# Patient Record
Sex: MALE | Race: WHITE | NOT HISPANIC OR LATINO | Employment: OTHER | URBAN - METROPOLITAN AREA
[De-identification: names, ages, dates, MRNs, and addresses within clinical notes are randomized per-mention and may not be internally consistent; named-entity substitution may affect disease eponyms.]

---

## 2017-04-25 ENCOUNTER — OFFICE VISIT (OUTPATIENT)
Dept: FAMILY MEDICINE | Facility: CLINIC | Age: 42
End: 2017-04-25
Payer: COMMERCIAL

## 2017-04-25 VITALS
SYSTOLIC BLOOD PRESSURE: 138 MMHG | HEIGHT: 70 IN | RESPIRATION RATE: 18 BRPM | TEMPERATURE: 98 F | BODY MASS INDEX: 39.33 KG/M2 | HEART RATE: 96 BPM | DIASTOLIC BLOOD PRESSURE: 78 MMHG | OXYGEN SATURATION: 97 % | WEIGHT: 274.69 LBS

## 2017-04-25 DIAGNOSIS — Z00.00 ANNUAL PHYSICAL EXAM: Primary | ICD-10-CM

## 2017-04-25 PROCEDURE — 99386 PREV VISIT NEW AGE 40-64: CPT | Mod: S$GLB,,, | Performed by: FAMILY MEDICINE

## 2017-04-25 PROCEDURE — 99999 PR PBB SHADOW E&M-NEW PATIENT-LVL III: CPT | Mod: PBBFAC,,, | Performed by: FAMILY MEDICINE

## 2017-04-25 RX ORDER — PHENTERMINE HYDROCHLORIDE 37.5 MG/1
37.5 CAPSULE ORAL EVERY MORNING
COMMUNITY
End: 2017-09-12

## 2017-04-25 RX ORDER — AMOXICILLIN 500 MG
2 CAPSULE ORAL DAILY
COMMUNITY
End: 2020-11-22

## 2017-04-25 NOTE — PROGRESS NOTES
HPI:  Solitario Fan is a 42 y.o. year old male that  presents to get established as patients.He is trying to lose about 20 pounds and is here to have a physical and get lab work done from his weight loss doctor. He has been as much as 400 pounds following having to be on steroids for a pericarditis. He initially was very athletic and weighed about 180 pounds.  Chief Complaint   Patient presents with    Establish Care   .     HPI    History reviewed. No pertinent past medical history.  Social History     Social History    Marital status:      Spouse name: N/A    Number of children: N/A    Years of education: N/A     Occupational History    Not on file.     Social History Main Topics    Smoking status: Former Smoker     Quit date: 1/25/2017    Smokeless tobacco: Not on file    Alcohol use No    Drug use: No    Sexual activity: Not on file     Other Topics Concern    Not on file     Social History Narrative    No narrative on file     Past Surgical History:   Procedure Laterality Date    PERICARDIECTOMY      THYMECTOMY      WRIST FRACTURE SURGERY Right      Family History   Problem Relation Age of Onset    No Known Problems Mother     No Known Problems Father     No Known Problems Sister     No Known Problems Brother     No Known Problems Daughter     No Known Problems Daughter            Review of Systems  General ROS: negative for chills, fever or weight loss  Psychological ROS: negative for hallucination, depression or suicidal ideation  Ophthalmic ROS: negative for blurry vision, photophobia or eye pain  ENT ROS: negative for epistaxis, sore throat or rhinorrhea  Respiratory ROS: no cough, shortness of breath, or wheezing  Cardiovascular ROS: no chest pain or dyspnea on exertion  Gastrointestinal ROS: no abdominal pain, change in bowel habits, or black/ bloody stools  Genito-Urinary ROS: no dysuria, trouble voiding, or hematuria  Musculoskeletal ROS: negative for gait disturbance or  "muscular weakness  Neurological ROS: no syncope or seizures; no ataxia  Dermatological ROS: negative for pruritis, rash and jaundice      Physical Exam:  /78 (BP Location: Left arm, Patient Position: Sitting, BP Method: Manual)  Pulse 96  Temp 97.9 °F (36.6 °C) (Oral)   Resp 18  Ht 5' 10" (1.778 m)  Wt 124.6 kg (274 lb 11.1 oz)  SpO2 97%  BMI 39.41 kg/m2  General appearance: alert, cooperative, no distress  Constitutional:Oriented to person, place, and time.appears well-developed and well-nourished.  HEENT: Normocephalic, atraumatic, neck symmetrical, no nasal discharge, TM - clear bilaterally   Eyes: conjunctivae/corneas clear, PERRL, EOM's intact  Lungs: clear to auscultation bilaterally, no dullness to percussion bilaterally  Heart: regular rate and rhythm without rub; no displacement of the PMI   Abdomen: soft, non-tender; bowel sounds normoactive; no organomegaly  Extremities: extremities symmetric; no clubbing, cyanosis, or edema  Integument: Skin color, texture, turgor normal; no rashes; hair distrubution normal  Neurologic: Alert and oriented X 3, normal strength, normal coordination and gait  Psychiatric: no pressured speech; normal affect; no evidence of impaired cognition   Physical Exam  LABS:    Complete Blood Count  No results found for: RBC, HGB, HCT, MCV, MCH, MCHC, RDW, PLT, MPV, GRAN, LYMPH, MONO, EOS, BASO, GRAN, LYMPH, MONO, EOSINOPHIL, BASOPHIL, DIFFMETHOD    Comprehensive Metabolic Panel  No results found for: GLU, BUN, CREATININE, NA, K, CL, PROT, ALBUMIN, BILITOT, AST, ALKPHOS, CO2, ALT, ANIONGAP, EGFRNONAA, ESTGFRAFRICA    LIPID  No components found for: LIPIDPANEL    TSH  No results found for: TSH      Assessment:    ICD-10-CM ICD-9-CM    1. Annual physical exam Z00.00 V70.0 Comprehensive metabolic panel      Lipid panel      CBC auto differential      TSH         Plan:    Return in 1 week (on 5/2/2017).          Jyotsna Seay MD  "

## 2017-04-25 NOTE — MR AVS SNAPSHOT
"    [facility]  [id number] [place] [address]  [place] [address]  Phone: [phone]  Fax: [phone]                  Solitario Fan   2017 2:00 PM   Office Visit    Description:  Male : 1975   Provider:  Jyotsna Seay MD   Department:  [facility]           Reason for Visit     Establish Care           Diagnoses this Visit        Comments    Annual physical exam    -  Primary            To Do List           Future Appointments        Provider Department Dept Phone    10/25/2017 8:00 AM Jyotsna Seay MD [facility] [phone]      Goals (5 Years of Data)     None      [facility]     [facility] On Call Nurse Care Line -  Assistance  Unless otherwise directed by your provider, please contact [facility], our nurse care line that is available for  assistance.     Registered nurses in the [facility] On Call Center provide: appointment scheduling, clinical advisement, health education, and other advisory services.  Call: [phone] (toll free)               Medications                Verify that the below list of medications is an accurate representation of the medications you are currently taking.  If none reported, the list may be blank. If incorrect, please contact your healthcare provider. Carry this list with you in case of emergency.           Current Medications     fish oil-omega-3 fatty acids 300-1,000 mg capsule Take 2 g by mouth once daily.    phentermine (ADIPEX-P) 37.5 MG capsule Take 37.5 mg by mouth every morning.           Clinical Reference Information           Your Vitals Were     BP Pulse Temp Resp    138/78 (BP Location: Left arm, Patient Position: Sitting, BP Method: Manual) 96 97.9 °F (36.6 °C) (Oral) 18    Height Weight SpO2 BMI    5' 10" (1.778 m) 124.6 kg (274 lb 11.1 oz) 97% 39.41 kg/m2      Blood Pressure          Most Recent Value    BP  138/78      Allergies as of 2017     No Known Allergies    "   Immunizations Administered on Date of Encounter - 4/25/2017     None      Orders Placed During Today's Visit     Future Labs/Procedures Expected by Expires    CBC auto differential  4/25/2017 6/24/2018    Comprehensive metabolic panel  4/25/2017 6/24/2018    Lipid panel  4/25/2017 6/24/2018    TSH  4/25/2017 6/24/2018      Language Assistance Services     ATTENTION: Language assistance services are available, free of charge. Please call [phone].      ATENCIÓN: Si habla erick, tiene a schroeder disposición servicios gratuitos de asistencia lingüística. Llame al [phone].     CHÚ Ý: N?u b?n nói Ti?ng Vi?t, có các d?ch v? h? tr? ngôn ng? mi?n phí dành cho b?n. G?i s? [phone].         [facility] complies with applicable Federal civil rights laws and does not discriminate on the basis of race, color, national origin, age, disability, or sex.

## 2017-04-27 ENCOUNTER — PATIENT MESSAGE (OUTPATIENT)
Dept: FAMILY MEDICINE | Facility: CLINIC | Age: 42
End: 2017-04-27

## 2017-05-04 ENCOUNTER — OFFICE VISIT (OUTPATIENT)
Dept: FAMILY MEDICINE | Facility: CLINIC | Age: 42
End: 2017-05-04
Payer: COMMERCIAL

## 2017-05-04 VITALS
RESPIRATION RATE: 18 BRPM | WEIGHT: 273.69 LBS | HEART RATE: 100 BPM | BODY MASS INDEX: 39.18 KG/M2 | OXYGEN SATURATION: 96 % | HEIGHT: 70 IN | TEMPERATURE: 98 F | DIASTOLIC BLOOD PRESSURE: 80 MMHG | SYSTOLIC BLOOD PRESSURE: 132 MMHG

## 2017-05-04 DIAGNOSIS — R74.8 ABNORMAL LIVER ENZYMES: Primary | ICD-10-CM

## 2017-05-04 DIAGNOSIS — E78.1 HYPERTRIGLYCERIDEMIA: ICD-10-CM

## 2017-05-04 PROCEDURE — 99213 OFFICE O/P EST LOW 20 MIN: CPT | Mod: S$GLB,,, | Performed by: FAMILY MEDICINE

## 2017-05-04 PROCEDURE — 1160F RVW MEDS BY RX/DR IN RCRD: CPT | Mod: S$GLB,,, | Performed by: FAMILY MEDICINE

## 2017-05-04 PROCEDURE — 99999 PR PBB SHADOW E&M-EST. PATIENT-LVL III: CPT | Mod: PBBFAC,,, | Performed by: FAMILY MEDICINE

## 2017-05-04 NOTE — MR AVS SNAPSHOT
"    [facility]  [address] [place] [address]  [place] [address]  Phone: [phone]  Fax: [phone]                  Solitario Fan   2017 8:40 AM   Office Visit    Description:  Male : 1975   Provider:  Jyotsna Seay MD   Department:  [facility]           Reason for Visit     Follow-up           Diagnoses this Visit        Comments    Abnormal liver enzymes    -  Primary     Hypertriglyceridemia                To Do List           Future Appointments        Provider Department Dept Phone    10/25/2017 8:00 AM Jyotsna Seay MD [facility] [phone]      Goals (5 Years of Data)     None      [facility] On [facility]     [facility] On Call Nurse Care Line -  Assistance  Unless otherwise directed by your provider, please contact [facility], our nurse care line that is available for  assistance.     Registered nurses in the [facility] On Call Center provide: appointment scheduling, clinical advisement, health education, and other advisory services.  Call: [phone] (toll free)               Medications                Verify that the below list of medications is an accurate representation of the medications you are currently taking.  If none reported, the list may be blank. If incorrect, please contact your healthcare provider. Carry this list with you in case of emergency.           Current Medications     fish oil-omega-3 fatty acids 300-1,000 mg capsule Take 2 g by mouth once daily.    phentermine (ADIPEX-P) 37.5 MG capsule Take 37.5 mg by mouth every morning.           Clinical Reference Information           Your Vitals Were     BP Pulse Temp Resp Height Weight    132/80 (BP Location: Left arm, Patient Position: Sitting, BP Method: Manual) 100 98 °F (36.7 °C) (Oral) 18 5' 10" (1.778 m) 124.2 kg (273 lb 11.2 oz)    SpO2 BMI             96% 39.27 kg/m2         Blood Pressure          Most Recent Value    BP  132/80      Allergies as of 2017     " No Known Allergies      Immunizations Administered on Date of Encounter - 5/4/2017     None      Orders Placed During Today's Visit     Future Labs/Procedures Expected by Expires    Hepatic function panel  5/4/2017 7/3/2018    Hepatitis panel, acute  5/4/2017 7/3/2018    Lipid panel  5/4/2017 7/3/2018      Language Assistance Services     ATTENTION: Language assistance services are available, free of charge. Please call [phone].      ATENCIÓN: Si habla erick, tiene a schroeder disposición servicios gratuitos de asistencia lingüística. Llame al [phone].     CHÚ Ý: N?u b?n nói Ti?ng Vi?t, có các d?ch v? h? tr? ngôn ng? mi?n phí dành cho b?n. G?i s? [phone].         [facility] complies with applicable Federal civil rights laws and does not discriminate on the basis of race, color, national origin, age, disability, or sex.

## 2017-05-08 NOTE — PROGRESS NOTES
"HPI:  Solitario Fan is a 42 y.o. year old male that  presents for lab resuts.  Chief Complaint   Patient presents with    Follow-up     lab results   .     HPI      History reviewed. No pertinent past medical history.  Social History     Social History    Marital status:      Spouse name: N/A    Number of children: N/A    Years of education: N/A     Occupational History    Not on file.     Social History Main Topics    Smoking status: Former Smoker     Quit date: 1/25/2017    Smokeless tobacco: Not on file    Alcohol use No    Drug use: No    Sexual activity: Not on file     Other Topics Concern    Not on file     Social History Narrative     Past Surgical History:   Procedure Laterality Date    PERICARDIECTOMY      THYMECTOMY      WRIST FRACTURE SURGERY Right      Family History   Problem Relation Age of Onset    No Known Problems Mother     No Known Problems Father     No Known Problems Sister     No Known Problems Brother     No Known Problems Daughter     No Known Problems Daughter            Review of Systems  General ROS: negative for chills, fever or weight loss  ENT ROS: negative for epistaxis, sore throat or rhinorrhea  Respiratory ROS: no cough, shortness of breath, or wheezing  Cardiovascular ROS: no chest pain or dyspnea on exertion  Gastrointestinal ROS: no abdominal pain, change in bowel habits, or black/ bloody stools    Physical Exam:  /80 (BP Location: Left arm, Patient Position: Sitting, BP Method: Manual)  Pulse 100  Temp 98 °F (36.7 °C) (Oral)   Resp 18  Ht 5' 10" (1.778 m)  Wt 124.2 kg (273 lb 11.2 oz)  SpO2 96%  BMI 39.27 kg/m2  General appearance: alert, cooperative, no distress  Constitutional:Oriented to person, place, and time.appears well-developed and well-nourished.  Lungs: clear to auscultation bilaterally, no dullness to percussion bilaterally  Heart: regular rate and rhythm without rub; no displacement of the PMI , S1&S2 present    Physical " Exam    LABS:    Complete Blood Count  Lab Results   Component Value Date    RBC 5.61 04/26/2017    HGB 16.4 04/26/2017    HCT 46.2 04/26/2017    MCV 82 04/26/2017    MCH 29.2 04/26/2017    MCHC 35.5 04/26/2017    RDW 12.9 04/26/2017     04/26/2017    MPV 11.2 04/26/2017    GRAN 2.8 04/26/2017    GRAN 46.8 04/26/2017    LYMPH 2.5 04/26/2017    LYMPH 41.6 04/26/2017    MONO 0.5 04/26/2017    MONO 9.1 04/26/2017    EOS 0.1 04/26/2017    BASO 0.02 04/26/2017    EOSINOPHIL 2.2 04/26/2017    BASOPHIL 0.3 04/26/2017    DIFFMETHOD Automated 04/26/2017       Comprehensive Metabolic Panel  Lab Results   Component Value Date    GLU 98 04/26/2017    BUN 15 04/26/2017    CREATININE 1.17 04/26/2017     (H) 04/26/2017    K 4.0 04/26/2017     04/26/2017    PROT 8.1 04/26/2017    ALBUMIN 4.9 04/26/2017    BILITOT 1.1 (H) 04/26/2017    AST 39 04/26/2017    ALKPHOS 46 04/26/2017    CO2 28 04/26/2017    ALT 73 (H) 04/26/2017    ANIONGAP 16 04/26/2017    EGFRNONAA >60.0 04/26/2017    ESTGFRAFRICA >60.0 04/26/2017       LIPID  No components found for: LIPID PROFILE    TSH  Lab Results   Component Value Date    TSH 2.740 04/26/2017         Assessment:    ICD-10-CM ICD-9-CM    1. Abnormal liver enzymes R74.8 790.5 Hepatitis panel, acute      Hepatic function panel   2. Hypertriglyceridemia E78.1 272.1 Lipid panel         Plan:  Pt continues to be actively working on changing his diet and lifestyle to improve his weight , health and triglycerides. Will recheck lipid panel prior to next visit.Solitario was given information on how to improve their cholesterol by 1) Decreasing their intake of high fat foods (i.e. Fried foods,long, potato chips), 2) Increase GOOD fat intake (i.e. Omega Fatty Acids- olive oil , baked and broiled fish, flax seed,coconut oil), 3) Exercise 5 times a week,  30min/day, 4) High fiber intake daily (i.e. Whole grains and vegetable and raw fruits.   Return in 6 months (on 10/25/2017).          Jyotsna J  JOHNY Seay MD

## 2017-09-07 ENCOUNTER — OFFICE VISIT (OUTPATIENT)
Dept: FAMILY MEDICINE | Facility: CLINIC | Age: 42
End: 2017-09-07
Payer: COMMERCIAL

## 2017-09-07 VITALS
WEIGHT: 271.25 LBS | HEIGHT: 70 IN | TEMPERATURE: 98 F | SYSTOLIC BLOOD PRESSURE: 126 MMHG | OXYGEN SATURATION: 98 % | HEART RATE: 86 BPM | BODY MASS INDEX: 38.83 KG/M2 | RESPIRATION RATE: 18 BRPM | DIASTOLIC BLOOD PRESSURE: 74 MMHG

## 2017-09-07 DIAGNOSIS — R29.898 WEAKNESS OF FOOT, LEFT: ICD-10-CM

## 2017-09-07 DIAGNOSIS — R03.0 BLOOD PRESSURE ELEVATED WITHOUT HISTORY OF HTN: ICD-10-CM

## 2017-09-07 DIAGNOSIS — R20.9 DISTURBANCE OF SKIN SENSATION: Primary | ICD-10-CM

## 2017-09-07 PROCEDURE — 3008F BODY MASS INDEX DOCD: CPT | Mod: S$GLB,,, | Performed by: FAMILY MEDICINE

## 2017-09-07 PROCEDURE — 99999 PR PBB SHADOW E&M-EST. PATIENT-LVL IV: CPT | Mod: PBBFAC,,, | Performed by: FAMILY MEDICINE

## 2017-09-07 PROCEDURE — 99214 OFFICE O/P EST MOD 30 MIN: CPT | Mod: S$GLB,,, | Performed by: FAMILY MEDICINE

## 2017-09-07 NOTE — PROGRESS NOTES
HPI:  Solitario Fan is a 42 y.o. year old male that  presents with 3 week history of left sided rib pain. He also has a had a cough. He had sensation on Tuesday of finger tingling and numbness ion the left side of his faceand upper and lower extremities.. He went and checked his BP and it was 220/210 and then 198/168. He was treated for dehydration and bronchitis.all of his cardiac enzymes where nl.He denies any stress due to   Chief Complaint   Patient presents with    Follow-up     ED visit for hypertension&chest pain 9/05   .     HPI      Past Medical History:   Diagnosis Date    Hyperlipidemia     Thyroid disease      Social History     Social History    Marital status:      Spouse name: N/A    Number of children: N/A    Years of education: N/A     Occupational History    Not on file.     Social History Main Topics    Smoking status: Former Smoker     Quit date: 1/25/2017    Smokeless tobacco: Never Used    Alcohol use Yes      Comment: social    Drug use: No    Sexual activity: Not on file     Other Topics Concern    Not on file     Social History Narrative    No narrative on file     Past Surgical History:   Procedure Laterality Date    PERICARDIECTOMY      THYMECTOMY      WRIST FRACTURE SURGERY Right      Family History   Problem Relation Age of Onset    No Known Problems Mother     No Known Problems Father     No Known Problems Sister     No Known Problems Brother     No Known Problems Daughter     No Known Problems Daughter            Review of Systems  General ROS: negative for chills, fever or weight loss  ENT ROS: negative for epistaxis, sore throat or rhinorrhea  Respiratory ROS: no cough, shortness of breath, or wheezing  Cardiovascular ROS: no chest pain or dyspnea on exertion  Gastrointestinal ROS: no abdominal pain, change in bowel habits, or black/ bloody stools    Physical Exam:  /74 (BP Location: Right arm, Patient Position: Sitting, BP Method: Medium (Manual))   " Pulse 86   Temp 98.4 °F (36.9 °C) (Oral)   Resp 18   Ht 5' 10" (1.778 m)   Wt 123.1 kg (271 lb 4.4 oz)   SpO2 98%   BMI 38.92 kg/m²   General appearance: alert, cooperative, no distress  Constitutional:Oriented to person, place, and time.appears well-developed and well-nourished.  HEENT: Normocephalic, atraumatic, neck symmetrical, no nasal discharge, TM- clear bilaterally  Lungs: clear to auscultation bilaterally, no dullness to percussion bilaterally  Heart: regular rate and rhythm without rub; no displacement of the PMI , S1&S2 present  Abdomen: soft, non-tender; bowel sounds normoactive; no organomegaly  Physical Exam    LABS:    Complete Blood Count  Lab Results   Component Value Date    RBC 5.77 09/05/2017    HGB 17.1 09/05/2017    HCT 48.4 09/05/2017    MCV 84 09/05/2017    MCH 29.6 09/05/2017    MCHC 35.3 09/05/2017    RDW 13.7 09/05/2017     09/05/2017    MPV 10.8 09/05/2017    GRAN 4.8 09/05/2017    GRAN 48.3 09/05/2017    LYMPH 4.2 09/05/2017    LYMPH 42.5 09/05/2017    MONO 0.7 09/05/2017    MONO 7.5 09/05/2017    EOS 0.1 09/05/2017    BASO 0.03 09/05/2017    EOSINOPHIL 1.4 09/05/2017    BASOPHIL 0.3 09/05/2017    DIFFMETHOD Automated 09/05/2017       Comprehensive Metabolic Panel  Lab Results   Component Value Date     (H) 09/05/2017    BUN 20 09/05/2017    CREATININE 1.05 09/05/2017     09/05/2017    K 3.4 (L) 09/05/2017    CL 99 09/05/2017    PROT 8.9 (H) 09/05/2017    ALBUMIN 5.0 09/05/2017    BILITOT 0.9 09/05/2017    AST 32 09/05/2017    ALKPHOS 49 09/05/2017    CO2 27 09/05/2017    ALT 57 (H) 09/05/2017    ANIONGAP 13 09/05/2017    EGFRNONAA >60.0 09/05/2017    ESTGFRAFRICA >60.0 09/05/2017       LIPID  Lab Results   Component Value Date    CHOL 180 09/12/2017    HDL 34 (L) 09/12/2017         TSH  Lab Results   Component Value Date    TSH 2.740 04/26/2017       Current Outpatient Prescriptions   Medication Sig Dispense Refill    fish oil-omega-3 fatty acids 300-1,000 " mg capsule Take 2 g by mouth once daily.      naproxen (NAPROSYN) 500 MG tablet Take 1 tablet (500 mg total) by mouth 2 (two) times daily as needed (pain). 60 tablet 0    diphenhydrAMINE (BENADRYL) 25 mg capsule Take 1 each (25 mg total) by mouth every 6 (six) hours. 30 capsule 0    gemfibrozil (LOPID) 600 MG tablet Take 1 tablet (600 mg total) by mouth 2 (two) times daily before meals. 180 tablet 3     No current facility-administered medications for this visit.        Assessment:    ICD-10-CM ICD-9-CM    1. Disturbance of skin sensation R20.9 782.0 US Carotid Bilateral   2. Weakness of foot, left M21.42 734 US Carotid Bilateral   3. Blood pressure elevated without history of HTN R03.0 796.2          Plan:    Return in 7 weeks (on 10/25/2017).          Jyotsna Seay MD

## 2017-09-08 ENCOUNTER — HOSPITAL ENCOUNTER (OUTPATIENT)
Dept: RADIOLOGY | Facility: HOSPITAL | Age: 42
Discharge: HOME | End: 2017-09-08
Attending: FAMILY MEDICINE
Payer: COMMERCIAL

## 2017-09-08 ENCOUNTER — PATIENT MESSAGE (OUTPATIENT)
Dept: FAMILY MEDICINE | Facility: CLINIC | Age: 42
End: 2017-09-08

## 2017-09-08 DIAGNOSIS — R29.898 WEAKNESS OF FOOT, LEFT: ICD-10-CM

## 2017-09-08 PROCEDURE — 93880 EXTRACRANIAL BILAT STUDY: CPT | Mod: TC

## 2017-09-08 PROCEDURE — 93880 EXTRACRANIAL BILAT STUDY: CPT | Mod: 26,,, | Performed by: RADIOLOGY

## 2017-09-11 DIAGNOSIS — I65.23 ATHEROSCLEROSIS OF BOTH CAROTID ARTERIES: Primary | ICD-10-CM

## 2017-09-12 ENCOUNTER — TELEPHONE (OUTPATIENT)
Dept: FAMILY MEDICINE | Facility: CLINIC | Age: 42
End: 2017-09-12

## 2017-09-12 ENCOUNTER — OFFICE VISIT (OUTPATIENT)
Dept: FAMILY MEDICINE | Facility: CLINIC | Age: 42
End: 2017-09-12
Payer: COMMERCIAL

## 2017-09-12 VITALS
HEIGHT: 70 IN | DIASTOLIC BLOOD PRESSURE: 64 MMHG | HEART RATE: 75 BPM | RESPIRATION RATE: 18 BRPM | TEMPERATURE: 98 F | OXYGEN SATURATION: 97 % | BODY MASS INDEX: 39.16 KG/M2 | SYSTOLIC BLOOD PRESSURE: 110 MMHG | WEIGHT: 273.56 LBS

## 2017-09-12 VITALS
SYSTOLIC BLOOD PRESSURE: 118 MMHG | RESPIRATION RATE: 20 BRPM | DIASTOLIC BLOOD PRESSURE: 76 MMHG | BODY MASS INDEX: 39.16 KG/M2 | HEIGHT: 70 IN | WEIGHT: 273.56 LBS | OXYGEN SATURATION: 98 % | HEART RATE: 98 BPM

## 2017-09-12 DIAGNOSIS — E78.1 HYPERTRIGLYCERIDEMIA: ICD-10-CM

## 2017-09-12 DIAGNOSIS — E78.1 HYPERTRIGLYCERIDEMIA: Primary | ICD-10-CM

## 2017-09-12 DIAGNOSIS — T78.40XA ALLERGIC REACTION CAUSED BY A DRUG, INITIAL ENCOUNTER: Primary | ICD-10-CM

## 2017-09-12 DIAGNOSIS — I65.23 ATHEROSCLEROSIS OF BOTH CAROTID ARTERIES: Primary | ICD-10-CM

## 2017-09-12 PROCEDURE — 99213 OFFICE O/P EST LOW 20 MIN: CPT | Mod: 25,S$GLB,, | Performed by: NURSE PRACTITIONER

## 2017-09-12 PROCEDURE — 96372 THER/PROPH/DIAG INJ SC/IM: CPT | Mod: S$GLB,,, | Performed by: NURSE PRACTITIONER

## 2017-09-12 PROCEDURE — 99999 PR PBB SHADOW E&M-EST. PATIENT-LVL III: CPT | Mod: PBBFAC,,, | Performed by: FAMILY MEDICINE

## 2017-09-12 PROCEDURE — 99999 PR PBB SHADOW E&M-EST. PATIENT-LVL V: CPT | Mod: PBBFAC,,, | Performed by: NURSE PRACTITIONER

## 2017-09-12 PROCEDURE — 3008F BODY MASS INDEX DOCD: CPT | Mod: S$GLB,,, | Performed by: NURSE PRACTITIONER

## 2017-09-12 PROCEDURE — 99214 OFFICE O/P EST MOD 30 MIN: CPT | Mod: 25,S$GLB,, | Performed by: FAMILY MEDICINE

## 2017-09-12 PROCEDURE — 3008F BODY MASS INDEX DOCD: CPT | Mod: S$GLB,,, | Performed by: FAMILY MEDICINE

## 2017-09-12 RX ORDER — GEMFIBROZIL 600 MG/1
600 TABLET, FILM COATED ORAL
Qty: 180 TABLET | Refills: 3 | Status: SHIPPED | OUTPATIENT
Start: 2017-09-12 | End: 2018-09-12

## 2017-09-12 RX ORDER — DIPHENHYDRAMINE HCL 25 MG
25 CAPSULE ORAL EVERY 6 HOURS
Qty: 30 CAPSULE | Refills: 0 | Status: SHIPPED | OUTPATIENT
Start: 2017-09-12 | End: 2017-12-12

## 2017-09-12 RX ORDER — DIPHENHYDRAMINE HYDROCHLORIDE 50 MG/ML
50 INJECTION INTRAMUSCULAR; INTRAVENOUS
Status: COMPLETED | OUTPATIENT
Start: 2017-09-12 | End: 2017-09-12

## 2017-09-12 RX ORDER — GEMFIBROZIL 600 MG/1
600 TABLET, FILM COATED ORAL
Qty: 180 TABLET | Refills: 3 | Status: CANCELLED | OUTPATIENT
Start: 2017-09-12 | End: 2018-09-12

## 2017-09-12 RX ORDER — PHENTERMINE HYDROCHLORIDE 37.5 MG/1
37.5 TABLET ORAL DAILY
Refills: 0 | COMMUNITY
Start: 2017-06-14 | End: 2017-09-12

## 2017-09-12 RX ORDER — CLINDAMYCIN HYDROCHLORIDE 300 MG/1
CAPSULE ORAL
Refills: 0 | COMMUNITY
Start: 2017-06-13 | End: 2017-09-12

## 2017-09-12 RX ADMIN — DIPHENHYDRAMINE HYDROCHLORIDE 50 MG: 50 INJECTION INTRAMUSCULAR; INTRAVENOUS at 12:09

## 2017-09-12 NOTE — PATIENT INSTRUCTIONS
Local Allergic Reaction, Other  You are having an allergic reaction. Almost anything can cause one. Different people are allergic to different things. It is usually something that you ate or swallowed, came into contact with by getting or putting it on your skin or clothes, or something you breathed in the air. This can be very annoying and sometimes scary.  Symptoms of an allergic reaction can include:  · Rash, hives, redness, welts, blisters  · Itching, burning, stinging, pain  · Dry, flaky, cracking, scaly skin  · Swelling of the face, lips or other parts of the body  Sometimes the cause of an allergic reaction may be obvious. To help identify your allergen, remember:  · When it started  · What you were doing at the time or just before that  · Any activities you were involved in  · Any new products or contacts  Here are some common causes, but remember almost anything can cause a reaction, and you may not even be aware that you came into contact with one of these things.  · Dust, mold, pollen  · Plants such as poison ivy and poison oak are common ones, but there are many others  · Animals  · Foods such as shrimp, shellfish, peanuts, milk products, gluten, eggs; also colorings, flavorings, additives  · Insect bites or stings such as bees, mosquitos, flees, ticks  · Medicines such as penicillin, sulfa drugs, amoxicillin, aspirin, ibuprofen; any medicine can cause a reaction  · Jewelry such as nickel, gold  (new, or something youve worn for a while including zippers, and  buttons)  · Latex such as in gloves, clothes, toys, balloons, or some tapes (some people allergic to latex may also have problems with foods like bananas, avocados, kiwi, papaya, or chestnuts)  · Lotions, perfumes, cosmetics, soaps, shampoos, skincare products, nail products  · Chemicals or dyes in clothing, linen, , hair dyes, soaps, iodine  Home care    The goal of our treatment is to help relieve the symptoms, and get you feeling  better. The rash will usually fade over several days, but can sometimes last a couple of weeks. Over the next couple of days, there may be times when it is gets a little worse, and then better again. Here are some things to do:  · If you know what you are allergic to, avoid it because future reactions could be worse than this one.  · Avoid tight clothing and anything that heats up your skin (hot showers/baths, direct sunlight) since heat will make itching worse.  · An ice pack will relieve local areas of intense itching and redness. Dont put the ice directly on the skin, because it can damage the skin. You can also ice put it in a plastic bag. Wrap it in something like a towel, terrie shirt, or cloth.  · Oral Benadryl (diphenhydramine) is an antihistamine available at drug and grocery stores. Unless a prescription antihistamine was given, Benadryl may be used to reduce itching if large areas of the skin are involved. It may make you sleepy, so be careful using it in the daytime or when going to school, working, or driving. [NOTE: Do not use Benadryl if you have glaucoma or if you are a man with trouble urinating due to an enlarged prostate.] There are antihistamines that causes less drowsiness and is a good alternatives for daytime use. Ask your pharmacist for suggestions.  · Do not use Benadryl cream on your skin, because in some people it can cause a further reaction, and make you allergic to Benadryl.  · Try not to scratch. This can tear the skin and cause an infection.  · Using heat-steam to clean your home, using high-efficiency particulate (HEPA) vacuums and filters, avoiding food and pet triggers, exterminating cockroaches, and frequent house cleaning are a few of the strategies used to decrease allergic reactions.  Follow-up care  Follow up with your healthcare provider, or as advised if your symptoms do not continue to improve or get worse.  Call 911  Call 911 if any of these occur:  · Trouble breathing or  swallowing, wheezing  · New or worsening swelling in the mouth, throat, or tongue  · Hoarse voice or trouble speaking  · Confused   · Very drowsy or trouble awakening  · Fainting or loss of consciousness  · Rapid heart rate  · Low blood pressure  · Feeling of doom  · Nausea, vomiting, abdominal pain, diarrhea  · Vomiting blood, or large amounts of blood in stool  · Seizure  When to seek medical advice  Call your healthcare provider right away if any of the following occur:  · Spreading areas of itching, redness or swelling  · New or worse swelling in the face, eyelids, or  lips  · Dizziness, weakness  · Signs of infection:  ¨ Spreading redness  ¨ Increased pain or swelling  ¨ Fever of 100.4ºF (38ºC) or higher, or as directed by your healthcare provider  ¨ Colored fluid draining from the inflamed areas  Date Last Reviewed: 7/30/2015  © 4257-3569 [employer]. [address] [place] [address]. All rights reserved. This information is not intended as a substitute for professional medical care. Always follow your healthcare professional's instructions.

## 2017-09-12 NOTE — TELEPHONE ENCOUNTER
----- Message from Art Barnes sent at 9/12/2017 12:37 PM CDT -----  Contact: wife/Stephani  [phone]  Pt wife requesting to speak with the nurse concerning the pt having an allergic reaction to medication.  Please call and advise

## 2017-09-12 NOTE — PROGRESS NOTES
"HPI:  Solitario Fan is a 42 y.o. year old male that  presents fro f/u of carotid doppler results . He got his lipid level done this morning. He is currently only taking the omega 3 suppliment. No new episode of upper body tingling noted.  Chief Complaint   Patient presents with    Follow-up     US results--lipid not processed yet   .     HPI      Past Medical History:   Diagnosis Date    Hyperlipidemia     Thyroid disease      Social History     Social History    Marital status:      Spouse name: N/A    Number of children: N/A    Years of education: N/A     Occupational History    Not on file.     Social History Main Topics    Smoking status: Former Smoker     Quit date: 1/25/2017    Smokeless tobacco: Never Used    Alcohol use Yes      Comment: social    Drug use: No    Sexual activity: Not on file     Other Topics Concern    Not on file     Social History Narrative    No narrative on file     Past Surgical History:   Procedure Laterality Date    PERICARDIECTOMY      THYMECTOMY      WRIST FRACTURE SURGERY Right      Family History   Problem Relation Age of Onset    No Known Problems Mother     No Known Problems Father     No Known Problems Sister     No Known Problems Brother     No Known Problems Daughter     No Known Problems Daughter            Review of Systems  General ROS: negative for chills, fever or weight loss  ENT ROS: negative for epistaxis, sore throat or rhinorrhea  Respiratory ROS: no cough, shortness of breath, or wheezing  Cardiovascular ROS: no chest pain or dyspnea on exertion  Gastrointestinal ROS: no abdominal pain, change in bowel habits, or black/ bloody stools    Physical Exam:  /64 (BP Location: Left arm, Patient Position: Sitting, BP Method: Medium (Manual))   Pulse 75   Temp 98.3 °F (36.8 °C) (Oral)   Resp 18   Ht 5' 10" (1.778 m)   Wt 124.1 kg (273 lb 9.5 oz)   SpO2 97%   BMI 39.26 kg/m²   General appearance: alert, cooperative, no " distress  Constitutional:Oriented to person, place, and time.appears well-developed and well-nourished.  Lungs: clear to auscultation bilaterally, no dullness to percussion bilaterally  Heart: regular rate and rhythm without rub; no displacement of the PMI , S1&S2 present    Physical Exam    LABS:    Complete Blood Count  Lab Results   Component Value Date    RBC 5.77 09/05/2017    HGB 17.1 09/05/2017    HCT 48.4 09/05/2017    MCV 84 09/05/2017    MCH 29.6 09/05/2017    MCHC 35.3 09/05/2017    RDW 13.7 09/05/2017     09/05/2017    MPV 10.8 09/05/2017    GRAN 4.8 09/05/2017    GRAN 48.3 09/05/2017    LYMPH 4.2 09/05/2017    LYMPH 42.5 09/05/2017    MONO 0.7 09/05/2017    MONO 7.5 09/05/2017    EOS 0.1 09/05/2017    BASO 0.03 09/05/2017    EOSINOPHIL 1.4 09/05/2017    BASOPHIL 0.3 09/05/2017    DIFFMETHOD Automated 09/05/2017       Comprehensive Metabolic Panel  Lab Results   Component Value Date     (H) 09/05/2017    BUN 20 09/05/2017    CREATININE 1.05 09/05/2017     09/05/2017    K 3.4 (L) 09/05/2017    CL 99 09/05/2017    PROT 8.9 (H) 09/05/2017    ALBUMIN 5.0 09/05/2017    BILITOT 0.9 09/05/2017    AST 32 09/05/2017    ALKPHOS 49 09/05/2017    CO2 27 09/05/2017    ALT 57 (H) 09/05/2017    ANIONGAP 13 09/05/2017    EGFRNONAA >60.0 09/05/2017    ESTGFRAFRICA >60.0 09/05/2017       LIPID  Lab Results   Component Value Date    CHOL 180 09/12/2017    HDL 34 (L) 09/12/2017         TSH  Lab Results   Component Value Date    TSH 2.740 04/26/2017       Current Outpatient Prescriptions   Medication Sig Dispense Refill    fish oil-omega-3 fatty acids 300-1,000 mg capsule Take 2 g by mouth once daily.      naproxen (NAPROSYN) 500 MG tablet Take 1 tablet (500 mg total) by mouth 2 (two) times daily as needed (pain). 60 tablet 0    benzonatate (TESSALON) 100 MG capsule Take 1 capsule (100 mg total) by mouth 3 (three) times daily as needed for Cough. 20 capsule 0    diphenhydrAMINE (BENADRYL) 25 mg  capsule Take 1 each (25 mg total) by mouth every 6 (six) hours. 30 capsule 0    gemfibrozil (LOPID) 600 MG tablet Take 1 tablet (600 mg total) by mouth 2 (two) times daily before meals. 180 tablet 3     No current facility-administered medications for this visit.        Assessment:    ICD-10-CM ICD-9-CM    1. Atherosclerosis of both carotid arteries I65.23 433.10      433.30    2. Hypertriglyceridemia E78.1 272.1          Plan:  Instructed pt to begin a cholesterol plaque regression regimen of  Niacin, vitamin E , and garlic . He was cautioned that the niacin may cause facial flushing and to back down one tablet to dosage that flushing is not a problem.   I will decide which medication to start for his cholesterol once his results come in today for his lipid panel.  Return in 3 months (on 12/12/2017).          Jyotsna Seay MD  Answers for HPI/ROS submitted by the patient on 9/11/2017   activity change: No  unexpected weight change: No  neck pain: No  hearing loss: No  rhinorrhea: No  trouble swallowing: No  eye discharge: No  visual disturbance: No  chest tightness: No  wheezing: No  chest pain: No  palpitations: No  blood in stool: No  constipation: No  vomiting: No  diarrhea: No  polydipsia: No  polyuria: No  difficulty urinating: No  urgency: No  hematuria: No  joint swelling: No  arthralgias: No  headaches: No  weakness: No  confusion: No  dysphoric mood: No

## 2017-09-12 NOTE — PROGRESS NOTES
Subjective:       Patient ID: Solitario Fan is a 42 y.o. male.    Chief Complaint: Allergic Reaction    Patient was seen by Dr. Jyotsna Seay today and advised to start taking Niacin 500 mg daily.  Patient reports he got home and took the Niacin at 12 noon and within 10 minutes - started having tingling/pins and needles feeling to arms, chest and face and then noted redness to arms and torso with facial redness and swelling of cheeks and around eyes.  No tongue swelling or shortness of breath present.  Patient came straight to the office.  Dr. Seay was out for lunch so I am seeing patient today for urgent care visit.      Allergic Reaction   This is a new problem. The current episode started today. Progression since onset: Took Niacin 500 mg OTC at 12 noon and within 10 minutes - redness to arms and chest up with facial swelling to cheeks and around eyes. The problem is moderate. The patient was exposed to an OTC medication (Niacin). Associated symptoms include a rash. Pertinent negatives include no abdominal pain, chest pain, coughing, diarrhea, trouble swallowing or vomiting. (Erythema to arms and chest.  Facial redness with swelling of cheeks and around eyes) Past treatments include nothing. Swelling is present on the eyes and face.       Previous Medications    BENZONATATE (TESSALON) 100 MG CAPSULE    Take 1 capsule (100 mg total) by mouth 3 (three) times daily as needed for Cough.    FISH OIL-OMEGA-3 FATTY ACIDS 300-1,000 MG CAPSULE    Take 2 g by mouth once daily.    NAPROXEN (NAPROSYN) 500 MG TABLET    Take 1 tablet (500 mg total) by mouth 2 (two) times daily as needed (pain).       Past Medical History:   Diagnosis Date    Hyperlipidemia     Thyroid disease        Past Surgical History:   Procedure Laterality Date    PERICARDIECTOMY      THYMECTOMY      WRIST FRACTURE SURGERY Right        Family History   Problem Relation Age of Onset    No Known Problems Mother     No Known Problems Father      No Known Problems Sister     No Known Problems Brother     No Known Problems Daughter     No Known Problems Daughter        Social History     Social History    Marital status:      Spouse name: N/A    Number of children: N/A    Years of education: N/A     Social History Main Topics    Smoking status: Former Smoker     Quit date: 1/25/2017    Smokeless tobacco: Never Used    Alcohol use Yes      Comment: social    Drug use: No    Sexual activity: Not Asked     Other Topics Concern    None     Social History Narrative    None       Review of Systems   Constitutional: Negative for activity change, appetite change, fatigue, fever and unexpected weight change.   HENT: Negative for congestion, ear pain, mouth sores, nosebleeds, postnasal drip, rhinorrhea, sinus pressure, sneezing, sore throat, trouble swallowing and voice change.    Eyes: Negative.    Respiratory: Negative for cough, chest tightness and shortness of breath.    Cardiovascular: Negative for chest pain, palpitations and leg swelling.   Gastrointestinal: Negative.  Negative for abdominal pain, blood in stool, constipation, diarrhea, nausea and vomiting.   Endocrine: Negative.    Genitourinary: Negative for difficulty urinating, dysuria, flank pain, hematuria and urgency.   Musculoskeletal: Negative for arthralgias, back pain, gait problem, joint swelling, myalgias and neck pain.   Skin: Positive for rash. Negative for color change and wound.        Facial swelling   Allergic/Immunologic: Negative for immunocompromised state.   Neurological: Negative for dizziness, tremors, seizures, syncope, speech difficulty and headaches.   Hematological: Negative for adenopathy. Does not bruise/bleed easily.   Psychiatric/Behavioral: Negative for behavioral problems, dysphoric mood, sleep disturbance and suicidal ideas. The patient is not nervous/anxious.          Objective:     Vitals:    09/12/17 1250   BP: 118/76   BP Location: Right arm   Patient  "Position: Sitting   BP Method: Medium (Manual)   Pulse: 98   Resp: 20   SpO2: 98%   Weight: 124.1 kg (273 lb 9.5 oz)   Height: 5' 10" (1.778 m)          Physical Exam   Constitutional: He is oriented to person, place, and time. He appears well-developed. No distress.   Body mass index is 39.26 kg/m².   HENT:   Head: Normocephalic.   Right Ear: External ear normal.   Left Ear: External ear normal.   Nose: Nose normal.   Mouth/Throat: Oropharynx is clear and moist. No oropharyngeal exudate.   Facial redness with mild swelling to cheeks and around eyes.  NO lip swelling, NO tongue swelling.  Respirations even, unlabored   Eyes: EOM are normal. Pupils are equal, round, and reactive to light. Right eye exhibits no discharge. Left eye exhibits no discharge. No scleral icterus.   Neck: Normal range of motion. Neck supple. No JVD present. No tracheal deviation present. No thyromegaly present.   Cardiovascular: Normal rate, regular rhythm and normal heart sounds.    No murmur heard.  Pulmonary/Chest: Effort normal. No stridor. No respiratory distress. He has no wheezes. He has no rales.   Abdominal: Soft. He exhibits no distension.   Musculoskeletal: Normal range of motion. He exhibits no edema.   Lymphadenopathy:     He has no cervical adenopathy.   Neurological: He is alert and oriented to person, place, and time. Coordination normal.   Skin: Skin is warm and dry. He is not diaphoretic. There is erythema.        Erythema to upper arms, upper chest and back.  Erythema to face and around eye with mild swelling around cheeks and eyes.  See pictures below.   Psychiatric: He has a normal mood and affect. His behavior is normal.                     Assessment:         ICD-10-CM ICD-9-CM   1. Allergic reaction caused by a drug, initial encounter T78.40XA 995.27       Plan:       Allergic reaction caused by a drug, initial encounter  -  Wife at bedside.  Patient given Benadryl 50 mg IM.  He is reporting that he is feeling much " better.  Dr. Seay at bedside to assess patient as well.  Advised to stop Niacin intake.  Take oral Benadryl for next 5 days.  Return for worsening of symptoms.  -     diphenhydrAMINE injection 50 mg; Inject 1 mL (50 mg total) into the muscle one time.  -     diphenhydrAMINE (BENADRYL) 25 mg capsule; Take 1 each (25 mg total) by mouth every 6 (six) hours.  Dispense: 30 capsule; Refill: 0      Return if symptoms worsen or fail to improve.     Patient's Medications   New Prescriptions    DIPHENHYDRAMINE (BENADRYL) 25 MG CAPSULE    Take 1 each (25 mg total) by mouth every 6 (six) hours.   Previous Medications    BENZONATATE (TESSALON) 100 MG CAPSULE    Take 1 capsule (100 mg total) by mouth 3 (three) times daily as needed for Cough.    FISH OIL-OMEGA-3 FATTY ACIDS 300-1,000 MG CAPSULE    Take 2 g by mouth once daily.    NAPROXEN (NAPROSYN) 500 MG TABLET    Take 1 tablet (500 mg total) by mouth 2 (two) times daily as needed (pain).   Modified Medications    No medications on file   Discontinued Medications    No medications on file

## 2017-09-13 ENCOUNTER — PATIENT MESSAGE (OUTPATIENT)
Dept: FAMILY MEDICINE | Facility: CLINIC | Age: 42
End: 2017-09-13

## 2017-10-17 ENCOUNTER — TELEPHONE (OUTPATIENT)
Dept: FAMILY MEDICINE | Facility: CLINIC | Age: 42
End: 2017-10-17

## 2017-10-17 NOTE — TELEPHONE ENCOUNTER
----- Message from Sun Coon sent at 10/17/2017 10:41 AM CDT -----  Contact: [phone]/self   Pt requesting to speak with you in regarding his date of service on 09/07/17 . Please advise

## 2017-10-17 NOTE — TELEPHONE ENCOUNTER
"Patient's wife states the visit from 9/07 was coded as "flat foot" and the insurance isn't covering visit or orders that were placed at the visit. Patient's wife would like to know if MD can change coding for insurance company to approve appointment and US. Please advise  "

## 2017-10-18 ENCOUNTER — PATIENT MESSAGE (OUTPATIENT)
Dept: FAMILY MEDICINE | Facility: CLINIC | Age: 42
End: 2017-10-18

## 2017-12-04 ENCOUNTER — PATIENT MESSAGE (OUTPATIENT)
Dept: FAMILY MEDICINE | Facility: CLINIC | Age: 42
End: 2017-12-04

## 2017-12-04 DIAGNOSIS — E78.1 HYPERTRIGLYCERIDEMIA: Primary | ICD-10-CM

## 2017-12-12 ENCOUNTER — OFFICE VISIT (OUTPATIENT)
Dept: FAMILY MEDICINE | Facility: CLINIC | Age: 42
End: 2017-12-12
Payer: COMMERCIAL

## 2017-12-12 VITALS
HEIGHT: 70 IN | OXYGEN SATURATION: 98 % | WEIGHT: 277.25 LBS | BODY MASS INDEX: 39.69 KG/M2 | RESPIRATION RATE: 18 BRPM | TEMPERATURE: 98 F | SYSTOLIC BLOOD PRESSURE: 112 MMHG | HEART RATE: 78 BPM | DIASTOLIC BLOOD PRESSURE: 68 MMHG

## 2017-12-12 DIAGNOSIS — Z86.39 HISTORY OF HYPOTHYROIDISM: ICD-10-CM

## 2017-12-12 DIAGNOSIS — E78.1 HYPERTRIGLYCERIDEMIA: Primary | ICD-10-CM

## 2017-12-12 PROCEDURE — 99214 OFFICE O/P EST MOD 30 MIN: CPT | Mod: S$GLB,,, | Performed by: FAMILY MEDICINE

## 2017-12-12 PROCEDURE — 99999 PR PBB SHADOW E&M-EST. PATIENT-LVL III: CPT | Mod: PBBFAC,,, | Performed by: FAMILY MEDICINE

## 2017-12-12 NOTE — PROGRESS NOTES
HPI:  Solitario Fan is a 42 y.o. year old male that  Presents for f/u of lab results. He admits to not exercising as much since softball season is over.He has had a lot of family functions that have included food and he knows that his diet has suffered. He was given a supplement by his mother and would like to know if it is okay to eat.He is taking his gemfibrozil and omega 3 fatty acid aily to help his cholesterol. He does not eat red meat anymore and eats a lot of fresh fish.  Chief Complaint   Patient presents with    Follow-up     lab results   .     HPI      Past Medical History:   Diagnosis Date    Hyperlipidemia     Thyroid disease      Social History     Social History    Marital status:      Spouse name: N/A    Number of children: N/A    Years of education: N/A     Occupational History    Not on file.     Social History Main Topics    Smoking status: Former Smoker     Quit date: 1/25/2017    Smokeless tobacco: Never Used    Alcohol use Yes      Comment: social    Drug use: No    Sexual activity: Not on file     Other Topics Concern    Not on file     Social History Narrative    No narrative on file     Past Surgical History:   Procedure Laterality Date    PERICARDIECTOMY      THYMECTOMY      WRIST FRACTURE SURGERY Right      Family History   Problem Relation Age of Onset    No Known Problems Mother     No Known Problems Father     No Known Problems Sister     No Known Problems Brother     No Known Problems Daughter     No Known Problems Daughter            Review of Systems  General ROS: negative for chills, fever or weight loss  ENT ROS: negative for epistaxis, sore throat or rhinorrhea  Respiratory ROS: no cough, shortness of breath, or wheezing  Cardiovascular ROS: no chest pain or dyspnea on exertion  Gastrointestinal ROS: no abdominal pain, change in bowel habits, or black/ bloody stools    Physical Exam:  /68 (BP Location: Right arm, Patient Position: Sitting, BP  "Method: Medium (Manual))   Pulse 78   Temp 98 °F (36.7 °C) (Oral)   Resp 18   Ht 5' 10" (1.778 m)   Wt 125.8 kg (277 lb 3.7 oz)   SpO2 98%   BMI 39.78 kg/m²   General appearance: alert, cooperative, no distress  Constitutional:Oriented to person, place, and time.appears well-developed and well-nourished.  Lungs: clear to auscultation bilaterally, no dullness to percussion bilaterally  Heart: regular rate and rhythm without rub; no displacement of the PMI , S1&S2 present    Physical Exam    LABS:    Complete Blood Count  Lab Results   Component Value Date    RBC 5.77 09/05/2017    HGB 17.1 09/05/2017    HCT 48.4 09/05/2017    MCV 84 09/05/2017    MCH 29.6 09/05/2017    MCHC 35.3 09/05/2017    RDW 13.7 09/05/2017     09/05/2017    MPV 10.8 09/05/2017    GRAN 4.8 09/05/2017    GRAN 48.3 09/05/2017    LYMPH 4.2 09/05/2017    LYMPH 42.5 09/05/2017    MONO 0.7 09/05/2017    MONO 7.5 09/05/2017    EOS 0.1 09/05/2017    BASO 0.03 09/05/2017    EOSINOPHIL 1.4 09/05/2017    BASOPHIL 0.3 09/05/2017    DIFFMETHOD Automated 09/05/2017       Comprehensive Metabolic Panel  Lab Results   Component Value Date     (H) 09/05/2017    BUN 20 09/05/2017    CREATININE 1.05 09/05/2017     09/05/2017    K 3.4 (L) 09/05/2017    CL 99 09/05/2017    PROT 8.9 (H) 09/05/2017    ALBUMIN 5.0 09/05/2017    BILITOT 0.9 09/05/2017    AST 32 09/05/2017    ALKPHOS 49 09/05/2017    CO2 27 09/05/2017    ALT 57 (H) 09/05/2017    ANIONGAP 13 09/05/2017    EGFRNONAA >60.0 09/05/2017    ESTGFRAFRICA >60.0 09/05/2017       LIPID  Lab Results   Component Value Date    CHOL 182 12/11/2017    HDL 35 (L) 12/11/2017         TSH  Lab Results   Component Value Date    TSH 2.740 04/26/2017       Current Outpatient Prescriptions   Medication Sig Dispense Refill    fish oil-omega-3 fatty acids 300-1,000 mg capsule Take 2 g by mouth once daily.      gemfibrozil (LOPID) 600 MG tablet Take 1 tablet (600 mg total) by mouth 2 (two) times daily " before meals. 180 tablet 3     No current facility-administered medications for this visit.        Assessment:    ICD-10-CM ICD-9-CM    1. Hypertriglyceridemia E78.1 272.1    2. History of hypothyroidism Z86.39 V12.29          Plan:    Return in 8 weeks (on 2/9/2018).          Jyotsna Seay MD

## 2017-12-19 ENCOUNTER — PATIENT MESSAGE (OUTPATIENT)
Dept: FAMILY MEDICINE | Facility: CLINIC | Age: 42
End: 2017-12-19

## 2017-12-20 DIAGNOSIS — G47.30 SLEEP APNEA, UNSPECIFIED TYPE: Primary | ICD-10-CM

## 2018-01-11 DIAGNOSIS — G47.33 OBSTRUCTIVE SLEEP APNEA SYNDROME: Primary | ICD-10-CM

## 2018-04-24 ENCOUNTER — TELEPHONE (OUTPATIENT)
Dept: FAMILY MEDICINE | Facility: CLINIC | Age: 43
End: 2018-04-24

## 2018-04-24 NOTE — TELEPHONE ENCOUNTER
Patient had ER visit on 4/23 for chest pain, patient was informed to visit cardiologist however he needs referral.

## 2018-04-24 NOTE — TELEPHONE ENCOUNTER
----- Message from Georgina Alvarez sent at 4/24/2018 11:54 AM CDT -----  Contact: wife Zaria [phone]  Patient requesting to speak with you regarding getting a referral for a cardiologist. Fax [phone]. Please advise.

## 2018-04-25 DIAGNOSIS — R07.9 CHEST PAIN, UNSPECIFIED TYPE: Primary | ICD-10-CM

## 2018-05-09 ENCOUNTER — OFFICE VISIT (OUTPATIENT)
Dept: CARDIOLOGY | Facility: CLINIC | Age: 43
End: 2018-05-09
Payer: COMMERCIAL

## 2018-05-09 VITALS
SYSTOLIC BLOOD PRESSURE: 150 MMHG | DIASTOLIC BLOOD PRESSURE: 110 MMHG | HEART RATE: 108 BPM | OXYGEN SATURATION: 98 % | WEIGHT: 263 LBS | HEIGHT: 70 IN | BODY MASS INDEX: 37.65 KG/M2

## 2018-05-09 DIAGNOSIS — R07.9 CHEST PAIN, UNSPECIFIED TYPE: ICD-10-CM

## 2018-05-09 DIAGNOSIS — R06.09 DOE (DYSPNEA ON EXERTION): ICD-10-CM

## 2018-05-09 DIAGNOSIS — E66.9 OBESITY (BMI 35.0-39.9 WITHOUT COMORBIDITY): ICD-10-CM

## 2018-05-09 PROCEDURE — 99999 PR PBB SHADOW E&M-EST. PATIENT-LVL III: CPT | Mod: PBBFAC,,, | Performed by: INTERNAL MEDICINE

## 2018-05-09 PROCEDURE — 99205 OFFICE O/P NEW HI 60 MIN: CPT | Mod: S$GLB,,, | Performed by: INTERNAL MEDICINE

## 2018-05-09 PROCEDURE — 3008F BODY MASS INDEX DOCD: CPT | Mod: CPTII,S$GLB,, | Performed by: INTERNAL MEDICINE

## 2018-05-09 RX ORDER — GREEN TEA LEAF EXTRACT 250 MG
CAPSULE ORAL
COMMUNITY
End: 2020-11-22

## 2018-05-09 NOTE — PATIENT INSTRUCTIONS
Assessment/Plan:  Solitario Fan is a 43 y.o. male with a past medical history of HLD, HTN, morbid obesity, who presents for follow up after recent ED discharge.    1. Chest Pain- Pt with chest pain and risk factors for CAD, including morbid obesity.  Check echo and PET stress test given body habitus.    2. Morbid Obesity- Refer to nutrition for assistance with weight loss.     Follow up in 2 weeks

## 2018-05-09 NOTE — PROGRESS NOTES
[facility]    CC:   Chief Complaint   Patient presents with    Hypertension    Follow-up     angina-Gas       Patient ID: Solitario Fan is a 43 y.o. male with a past medical history of HLD, HTN, morbid obesity, who presents for follow up after recent ED discharge.  Pertinent history/events are as follows:     -On 4/23/2018 pt presented to ED with complaints of left sided chest tightness, cold sweats and SOB.  Symptoms improved with GI cocktail.  Nonischemic ECG with negative troponins ×2.  Pt discharged home.      HPI:  Mr. Fan reports episodes of left sided tightness since ED discharge.  Also gets SOB on exertion.  Formerly smoked 1 pack a day for 20 years.  Quit in 2016.  No family history of CAD/MI or sudden cardiac death.  Ten year ASCVD risk score is 2.4%.      Past Medical History:   Diagnosis Date    Hyperlipidemia     Thyroid disease      Past Surgical History:   Procedure Laterality Date    PERICARDIECTOMY      THYMECTOMY      WRIST FRACTURE SURGERY Right      Social History     Social History    Marital status:      Spouse name: N/A    Number of children: N/A    Years of education: N/A     Occupational History    Not on file.     Social History Main Topics    Smoking status: Former Smoker     Quit date: 1/25/2017    Smokeless tobacco: Never Used    Alcohol use Yes      Comment: social    Drug use: No    Sexual activity: Not on file     Other Topics Concern    Not on file     Social History Narrative    No narrative on file     Family History   Problem Relation Age of Onset    No Known Problems Mother     No Known Problems Father     No Known Problems Sister     No Known Problems Brother     No Known Problems Daughter     No Known Problems Daughter        Review of patient's allergies indicates:   Allergen Reactions    Niacin preparations Other (See Comments)     Acute erythematous rash chest up with facial swelling       Medication List with Changes/Refills  "  Current Medications    FISH OIL-OMEGA-3 FATTY ACIDS 300-1,000 MG CAPSULE    Take 2 g by mouth once daily.    GARLIC 1 MG CAP    Take 1,000 mg by mouth.     GEMFIBROZIL (LOPID) 600 MG TABLET    Take 1 tablet (600 mg total) by mouth 2 (two) times daily before meals.    GREEN TEA LEAF EXTRACT (GREEN TEA) 250 MG CAP    Take by mouth.    KRILL OIL ORAL    Take 500 mg by mouth.     VITAMIN E 100 UNIT CAPSULE    Take 100 Units by mouth once daily.       Review of Systems  Constitution: Denies chills, fever, and sweats.  HENT: Denies headaches or blurry vision.  Cardiovascular: Positive for chest pain.  Respiratory: Positive for shortness of breath on exertion.   Gastrointestinal: Denies abdominal pain, nausea, or vomiting.  Musculoskeletal: Denies muscle cramps.  Neurological: Denies dizziness or focal weakness.  Psychiatric/Behavioral: Normal mental status.  Hematologic/Lymphatic: Denies bleeding problem or easy bruising/bleeding.  Skin: Denies rash or suspicious lesions    Physical Examination  BP (!) 150/110 (BP Location: Left arm, Patient Position: Sitting, BP Method: X-Large (Manual))   Pulse 108   Ht 5' 10" (1.778 m)   Wt 119.3 kg (263 lb 0.1 oz)   SpO2 98%   BMI 37.74 kg/m²     Constitutional: No acute distress, conversant  HEENT: Sclera anicteric, Pupils equal, round and reactive to light, extraocular motions intact, Oropharynx clear  Neck: No JVD, no carotid bruits  Cardiovascular: regular rate and rhythm, no murmur, rubs or gallops, normal S1/S2  Pulmonary: Clear to auscultation bilaterally  Abdominal: Abdomen soft, nontender, nondistended, positive bowel sounds  Extremities: No lower extremity edema,   Pulses:  Carotid pulses are 2+ on the right side, and 2+ on the left side.  Radial pulses are 2+ on the right side, and 2+ on the left side.   Femoral pulses are 2+ on the right side, and 2+ on the left side.  Skin: No ecchymosis, erythema, or ulcers  Psych: Alert and oriented x 3, appropriate " affect  Neuro: CNII-XII intact, no focal deficits    Labs:  Most Recent Data  CBC:   Lab Results   Component Value Date    WBC 5.58 04/23/2018    HGB 16.1 04/23/2018    HCT 45.2 04/23/2018     04/23/2018    MCV 83 04/23/2018    RDW 12.9 04/23/2018     BMP:   Lab Results   Component Value Date     04/23/2018    K 3.9 04/23/2018     04/23/2018    CO2 27 04/23/2018    BUN 11 04/23/2018    CREATININE 0.93 04/23/2018     04/23/2018    CALCIUM 9.5 04/23/2018     LFTS;   Lab Results   Component Value Date    PROT 7.6 04/23/2018    ALBUMIN 4.5 04/23/2018    BILITOT 0.4 04/23/2018    AST 27 04/23/2018    ALKPHOS 38 04/23/2018    ALT 47 (H) 04/23/2018     COAGS: No results found for: INR, PROTIME, PTT  FLP:   Lab Results   Component Value Date    CHOL 182 12/11/2017    HDL 35 (L) 12/11/2017    LDLCALC 87.6 12/11/2017    TRIG 297 (H) 12/11/2017    CHOLHDL 19.2 (L) 12/11/2017     CARDIAC:   Lab Results   Component Value Date    TROPONINI <0.012 04/23/2018       EKG 4/23/2018:  Normal sinus rhythm  Possible Left atrial enlargement    Assessment/Plan:  Solitario Fan is a 43 y.o. male with a past medical history of HLD, HTN, morbid obesity, who presents for follow up after recent ED discharge.    1. Chest Pain- Pt with chest pain and risk factors for CAD, including morbid obesity.  Check echo and PET stress test given body habitus.    2. Morbid Obesity- Refer to nutrition for assistance with weight loss.     Follow up in 2 weeks    Total duration of face to face visit time 30 minutes.  Total time spent counseling greater than fifty percent of total visit time.  Counseling included discussion regarding imaging findings, diagnosis, possibilities, treatment options, risks and benefits.  The patient had many questions regarding the options and long-term effects.    Morro Pugh MD, PhD  Interventional Cardiology

## 2018-05-09 NOTE — LETTER
May 9, 2018      Jyotsna Seay MD  [address] [facility]  [address]  [facility] [address]           [place] - Cardiology  [address]  [facility] [address]  Phone: [phone]  Fax: [phone]          Patient: Solitario Fan   MR Number: [id number]   YOB: 1975   Date of Visit: 5/9/2018       Dear Dr. Jyotsna Seay:    Thank you for referring Solitario Fan to me for evaluation. Attached you will find relevant portions of my assessment and plan of care.    If you have questions, please do not hesitate to call me. I look forward to following Solitario Fan along with you.    Sincerely,    Morro Pugh MD PhD    Enclosure  CC:  No Recipients    If you would like to receive this communication electronically, please contact [email or web address] or [phone] to request more information on [email or web address] Link access.    For providers and/or their staff who would like to refer a patient to [facility], please contact us through our one-stop-shop provider referral line, [facility], at [phone].    If you feel you have received this communication in error or would no longer like to receive these types of communications, please e-mail [email or web address]

## 2018-05-14 ENCOUNTER — TELEPHONE (OUTPATIENT)
Dept: FAMILY MEDICINE | Facility: CLINIC | Age: 43
End: 2018-05-14

## 2018-05-14 ENCOUNTER — PATIENT MESSAGE (OUTPATIENT)
Dept: FAMILY MEDICINE | Facility: CLINIC | Age: 43
End: 2018-05-14

## 2018-05-14 NOTE — TELEPHONE ENCOUNTER
----- Message from Sun Coon sent at 5/14/2018 11:51 AM CDT -----  Contact: [phone]  Pt its requesting  an appointment for today , states he has a bug bite that looks infected and its very swollen . Please advise

## 2018-05-14 NOTE — TELEPHONE ENCOUNTER
----- Message from Leeann James sent at 5/14/2018 12:34 PM CDT -----  Contact: [phone]/self  Patient called in returning your call. Please advise.

## 2019-04-15 ENCOUNTER — PATIENT OUTREACH (OUTPATIENT)
Dept: ADMINISTRATIVE | Facility: HOSPITAL | Age: 44
End: 2019-04-15

## 2020-11-22 ENCOUNTER — HOSPITAL ENCOUNTER (EMERGENCY)
Facility: HOSPITAL | Age: 45
Discharge: HOME | End: 2020-11-22
Attending: EMERGENCY MEDICINE

## 2020-11-22 VITALS
OXYGEN SATURATION: 99 % | BODY MASS INDEX: 33.34 KG/M2 | HEART RATE: 105 BPM | WEIGHT: 220 LBS | DIASTOLIC BLOOD PRESSURE: 94 MMHG | RESPIRATION RATE: 18 BRPM | HEIGHT: 68 IN | TEMPERATURE: 99 F | SYSTOLIC BLOOD PRESSURE: 148 MMHG

## 2020-11-22 DIAGNOSIS — N20.1 RIGHT URETERAL STONE: ICD-10-CM

## 2020-11-22 DIAGNOSIS — N20.1 CALCULUS OF URETEROVESICAL JUNCTION (UVJ): Primary | ICD-10-CM

## 2020-11-22 LAB
ALBUMIN SERPL BCP-MCNC: 4.2 G/DL (ref 3.5–5.2)
ALP SERPL-CCNC: 38 U/L (ref 55–135)
ALT SERPL W/O P-5'-P-CCNC: 14 U/L (ref 10–44)
ANION GAP SERPL CALC-SCNC: 13 MMOL/L (ref 8–16)
AST SERPL-CCNC: 16 U/L (ref 10–40)
BASOPHILS # BLD AUTO: 0.05 K/UL (ref 0–0.2)
BASOPHILS NFR BLD: 0.3 % (ref 0–1.9)
BILIRUB SERPL-MCNC: 0.5 MG/DL (ref 0.1–1)
BILIRUB UR QL STRIP: NEGATIVE
BUN SERPL-MCNC: 17 MG/DL (ref 6–20)
CALCIUM SERPL-MCNC: 8.9 MG/DL (ref 8.7–10.5)
CHLORIDE SERPL-SCNC: 105 MMOL/L (ref 95–110)
CLARITY UR: ABNORMAL
CO2 SERPL-SCNC: 20 MMOL/L (ref 23–29)
COLOR UR: YELLOW
CREAT SERPL-MCNC: 1.4 MG/DL (ref 0.5–1.4)
DIFFERENTIAL METHOD: ABNORMAL
EOSINOPHIL # BLD AUTO: 0 K/UL (ref 0–0.5)
EOSINOPHIL NFR BLD: 0.2 % (ref 0–8)
ERYTHROCYTE [DISTWIDTH] IN BLOOD BY AUTOMATED COUNT: 13 % (ref 11.5–14.5)
EST. GFR  (AFRICAN AMERICAN): >60 ML/MIN/1.73 M^2
EST. GFR  (NON AFRICAN AMERICAN): 59 ML/MIN/1.73 M^2
GLUCOSE SERPL-MCNC: 104 MG/DL (ref 70–110)
GLUCOSE UR QL STRIP: NEGATIVE
HCT VFR BLD AUTO: 46.3 % (ref 40–54)
HGB BLD-MCNC: 15.9 G/DL (ref 14–18)
HGB UR QL STRIP: ABNORMAL
IMM GRANULOCYTES # BLD AUTO: 0.06 K/UL (ref 0–0.04)
IMM GRANULOCYTES NFR BLD AUTO: 0.3 % (ref 0–0.5)
KETONES UR QL STRIP: ABNORMAL
LEUKOCYTE ESTERASE UR QL STRIP: NEGATIVE
LIPASE SERPL-CCNC: 15 U/L (ref 4–60)
LYMPHOCYTES # BLD AUTO: 1.6 K/UL (ref 1–4.8)
LYMPHOCYTES NFR BLD: 8.9 % (ref 18–48)
MCH RBC QN AUTO: 28.4 PG (ref 27–31)
MCHC RBC AUTO-ENTMCNC: 34.3 G/DL (ref 32–36)
MCV RBC AUTO: 83 FL (ref 82–98)
MICROSCOPIC COMMENT: ABNORMAL
MONOCYTES # BLD AUTO: 1 K/UL (ref 0.3–1)
MONOCYTES NFR BLD: 5.6 % (ref 4–15)
NEUTROPHILS # BLD AUTO: 15.2 K/UL (ref 1.8–7.7)
NEUTROPHILS NFR BLD: 84.7 % (ref 38–73)
NITRITE UR QL STRIP: NEGATIVE
NRBC BLD-RTO: 0 /100 WBC
PH UR STRIP: 7 [PH] (ref 5–8)
PLATELET # BLD AUTO: 191 K/UL (ref 150–350)
PMV BLD AUTO: 10.8 FL (ref 9.2–12.9)
POTASSIUM SERPL-SCNC: 4 MMOL/L (ref 3.5–5.1)
PROT SERPL-MCNC: 7.1 G/DL (ref 6–8.4)
PROT UR QL STRIP: NEGATIVE
RBC # BLD AUTO: 5.59 M/UL (ref 4.6–6.2)
RBC #/AREA URNS HPF: >100 /HPF (ref 0–4)
SODIUM SERPL-SCNC: 138 MMOL/L (ref 136–145)
SP GR UR STRIP: 1 (ref 1–1.03)
TROPONIN I SERPL DL<=0.01 NG/ML-MCNC: 0.01 NG/ML (ref 0–0.03)
URN SPEC COLLECT METH UR: ABNORMAL
UROBILINOGEN UR STRIP-ACNC: NEGATIVE EU/DL
WBC # BLD AUTO: 18 K/UL (ref 3.9–12.7)

## 2020-11-22 PROCEDURE — 83690 ASSAY OF LIPASE: CPT

## 2020-11-22 PROCEDURE — 84484 ASSAY OF TROPONIN QUANT: CPT

## 2020-11-22 PROCEDURE — 80053 COMPREHEN METABOLIC PANEL: CPT

## 2020-11-22 PROCEDURE — 25000003 PHARM REV CODE 250: Performed by: EMERGENCY MEDICINE

## 2020-11-22 PROCEDURE — 25500020 PHARM REV CODE 255: Performed by: EMERGENCY MEDICINE

## 2020-11-22 PROCEDURE — 85025 COMPLETE CBC W/AUTO DIFF WBC: CPT

## 2020-11-22 PROCEDURE — 81000 URINALYSIS NONAUTO W/SCOPE: CPT

## 2020-11-22 PROCEDURE — 96361 HYDRATE IV INFUSION ADD-ON: CPT

## 2020-11-22 PROCEDURE — 99284 EMERGENCY DEPT VISIT MOD MDM: CPT | Mod: 25

## 2020-11-22 PROCEDURE — 96374 THER/PROPH/DIAG INJ IV PUSH: CPT

## 2020-11-22 PROCEDURE — 63600175 PHARM REV CODE 636 W HCPCS: Performed by: EMERGENCY MEDICINE

## 2020-11-22 PROCEDURE — 96375 TX/PRO/DX INJ NEW DRUG ADDON: CPT

## 2020-11-22 RX ORDER — ONDANSETRON 4 MG/1
4 TABLET, ORALLY DISINTEGRATING ORAL EVERY 8 HOURS PRN
Qty: 12 TABLET | Refills: 0 | Status: SHIPPED | OUTPATIENT
Start: 2020-11-22 | End: 2020-11-25

## 2020-11-22 RX ORDER — NAPROXEN 500 MG/1
500 TABLET ORAL 2 TIMES DAILY WITH MEALS
Qty: 60 TABLET | Refills: 0 | Status: SHIPPED | OUTPATIENT
Start: 2020-11-22

## 2020-11-22 RX ORDER — ONDANSETRON 2 MG/ML
4 INJECTION INTRAMUSCULAR; INTRAVENOUS
Status: COMPLETED | OUTPATIENT
Start: 2020-11-22 | End: 2020-11-22

## 2020-11-22 RX ORDER — MORPHINE SULFATE 4 MG/ML
4 INJECTION, SOLUTION INTRAMUSCULAR; INTRAVENOUS
Status: COMPLETED | OUTPATIENT
Start: 2020-11-22 | End: 2020-11-22

## 2020-11-22 RX ORDER — TAMSULOSIN HYDROCHLORIDE 0.4 MG/1
0.4 CAPSULE ORAL DAILY
Qty: 20 CAPSULE | Refills: 0 | Status: SHIPPED | OUTPATIENT
Start: 2020-11-22 | End: 2021-11-22

## 2020-11-22 RX ORDER — OXYCODONE AND ACETAMINOPHEN 10; 325 MG/1; MG/1
1 TABLET ORAL EVERY 4 HOURS PRN
Qty: 18 TABLET | Refills: 0 | Status: SHIPPED | OUTPATIENT
Start: 2020-11-22 | End: 2020-11-25

## 2020-11-22 RX ADMIN — SODIUM CHLORIDE 1000 ML: 0.9 INJECTION, SOLUTION INTRAVENOUS at 03:11

## 2020-11-22 RX ADMIN — IOHEXOL 100 ML: 350 INJECTION, SOLUTION INTRAVENOUS at 04:11

## 2020-11-22 RX ADMIN — MORPHINE SULFATE 4 MG: 4 INJECTION INTRAVENOUS at 03:11

## 2020-11-22 RX ADMIN — ONDANSETRON 4 MG: 2 INJECTION INTRAMUSCULAR; INTRAVENOUS at 03:11

## 2020-11-22 NOTE — PLAN OF CARE
ASSUMPTION OF CARE NOTE:    Care of patient assumed by self, from Dr. Tavares, as of shift change.    CT A/P demonstrates:  - non-obstructing 2mm stone in upper pole L kidney   - 5 x 4 mm stone at the right UVJ   - 3 mm stone in within the distal right ureter immediately proximal to it with some surrounding periureteral inflammatory change  - the right ureter and intrarenal collecting system are mildly dilated and there is some asymmetric right-sided perinephric inflammatory stranding  - slightly asymmetric and delayed nephrogram on the right  - constellation of findings suggests obstructive uropathy with superimposed infection/pyelonephritis  - GB unremarkable   - UA nitrite negative; no leuks; > 100 RBCs   - CBC notable for WBC of 18, 85.7% granulocytes   - Discussed case with on-call urology (Dr. Brionna Art) who did not feel pt needed to be admitted to the hospital at this time. Recommends pain control, Flowmax and urinary strainer.   - Discussed findings with patient; pt given strict return precautions for any new or worsening symptoms  - pt stable for discharge at this time  - at time of discharge, VSS, pt in no acute distress  - No further intervention is indicated at this time after having taken into account the patient's history, physical exam findings, and empirical and objective data obtained during the patient's emergency department workup.   - The patient is at low risk for an emergent medical condition at this time, and I am of the belief that that it is safe to discharge the patient from the emergency department.   - The patient is instructed to follow up as outpatient as indicated on the discharge paperwork.    - I have discussed the specifics of the workup with the patient and the patient has verbalized understanding of the details of the workup, the diagnosis, the treatment plan, and the need for outpatient follow-up.    - Although the patient has no emergent etiology today this does not preclude  the development of an emergent condition so, in addition, I have advised the patient that they can return to the ED and/or activate EMS at any time with worsening of their symptoms, change of their symptoms, or with any other medical complaint.    - The patient remained comfortable and stable during their visit in the ED.    - Discharge and follow-up instructions discussed with the patient who expressed understanding and willingness to comply with my recommendations.  - Results of all emergency department tests  discussed thoroughly with patient; all patient questions answered; pt in agreement with plan  - Pt instructed to follow up with PCP in 2-3 days for recheck of today's complaints  - Pt given strict emergency department return precautions for any new or worsening of symptoms  - Pt discharged from the emergency department in stable condition, in no acute distress

## 2020-11-22 NOTE — ED NOTES
Patient presents to ER with c/o RUQ abdominal pain since this morning. No medication taken. 10/10 on pain scale

## 2020-11-22 NOTE — ED PROVIDER NOTES
Encounter Date: 11/22/2020    SCRIBE #1 NOTE: I, Jessy [place], am scribing for, and in the presence of,  Dr. Tavares . I have scribed the entire note.       History     Chief Complaint   Patient presents with    Abdominal Pain     RUQ abdominal pain with nausea since early this morning. Presents awake, alert. Minimal relief from Fentanyl given enroute.      Time seen by provider: 2:13 PM    This is a 45 y.o. male who presents with complaint of sudden onset of right upper abdominal pain this morning while lying down. He notes pain gradually worsened throughout the day prompting him to call EMS. Pain is non-radiating and sharp/stabbing in nature. Pt endorses some nausea but denies any vomiting. Pt denies any diarrhea, back pain, dysuria or prior Hx of similar symptoms. He has never had this before, any movement makes it worse, nothing makes it better.   HE denies illnesses, trauma, fevers or chills.     The history is provided by the patient.     Review of patient's allergies indicates:   Allergen Reactions    Niacin preparations Other (See Comments)     Acute erythematous rash chest up with facial swelling     Past Medical History:   Diagnosis Date    Hyperlipidemia     Thyroid disease      Past Surgical History:   Procedure Laterality Date    CARDIAC SURGERY      PERICARDIECTOMY      THYMECTOMY      WRIST FRACTURE SURGERY Right      Family History   Problem Relation Age of Onset    No Known Problems Mother     No Known Problems Father     No Known Problems Sister     No Known Problems Brother     No Known Problems Daughter     No Known Problems Daughter      Social History     Tobacco Use    Smoking status: Former Smoker     Quit date: 1/25/2017     Years since quitting: 3.8    Smokeless tobacco: Never Used   Substance Use Topics    Alcohol use: Yes     Comment: social    Drug use: No     Review of Systems  Constitutional-no fever no chills  HEENT-no congestion, no ear pain, no nose bleed, no  sinus pain,  Eyes-no discharge, no itching, no redness, no visual change  Respiratory-no apnea, no chest tightness, no choking, no cough, no shortness of breath, no wheezing  Cardio-no chest pain  GI-no distention, abdominal pain, nausea, no vomiting, no diarrhea, no constipation  Endocrine-no cold intolerance, no heat intolerance  -no difficulty urinating, no dysuria, no flank pain,  MSK-no arthralgias, no joint swelling, no myalgias  Skin-no rashes  Allergy-no environmental allergy  Neurologic-no dizziness, no headache, no numbness, no seizure  Hematology-no swollen nodes  Behavioral-no confusion, no hallucinations, no nervousness     Physical Exam     Initial Vitals   BP Pulse Resp Temp SpO2   11/22/20 1405 11/22/20 1405 11/22/20 1405 11/22/20 1426 11/22/20 1405   (!) 160/110 82 18 97.6 °F (36.4 °C) 96 %      MAP       --                Physical Exam  Constitutional: uncomfortable man obvious distress  Eyes: Conjunctivae normal.  ENT       Head: Normocephalic, atraumatic.       Nose: No congestion.       Mouth/Throat: Mucous membranes are moist.  Hematological/Lymphatic/Immunilogical: No cervical lymphadenopathy.  Cardiovascular: Normal rate, regular rhythm. Normal and symmetric distal pulses.  Respiratory: Normal respiratory effort. Breath sounds are normal.  Gastrointestinal: Soft, RUQ abdominal pain; + belle sign  Musculoskeletal: Normal range of motion in all extremities. No obvious deformities or swelling.  Neurologic: Alert, oriented. Normal speech and language. No gross focal neurologic deficits are appreciated.  Skin: Skin is warm, dry. No rash noted.  Psychiatric: Mood and affect are normal.      ED Course   Procedures  Labs Reviewed   COMPREHENSIVE METABOLIC PANEL - Abnormal; Notable for the following components:       Result Value    CO2 20 (*)     Alkaline Phosphatase 38 (*)     eGFR if non  59 (*)     All other components within normal limits   CBC W/ AUTO DIFFERENTIAL - Abnormal;  Notable for the following components:    WBC 18.00 (*)     Gran # (ANC) 15.2 (*)     Immature Grans (Abs) 0.06 (*)     Gran % 84.7 (*)     Lymph % 8.9 (*)     All other components within normal limits   URINALYSIS, REFLEX TO URINE CULTURE - Abnormal; Notable for the following components:    Appearance, UA Hazy (*)     Ketones, UA Trace (*)     Occult Blood UA 3+ (*)     All other components within normal limits    Narrative:     Specimen Source->Urine   URINALYSIS MICROSCOPIC - Abnormal; Notable for the following components:    RBC, UA >100 (*)     All other components within normal limits    Narrative:     Specimen Source->Urine   LIPASE   TROPONIN I          Imaging Results          CT Abdomen Pelvis With Contrast (Final result)  Result time 11/22/20 16:49:28    Final result by Keyur Messer Jr., MD (11/22/20 16:49:28)                 Impression:      Right-sided obstructive uropathy as discussed above with superimposed pyelonephritis.  No evidence of intrarenal abscess.    Nonobstructing left nephrolithiasis      Electronically signed by: Keyur Amin Jr  Date:    11/22/2020  Time:    16:49             Narrative:    EXAMINATION:  CT ABDOMEN PELVIS WITH CONTRAST    CLINICAL HISTORY:  RLQ abdominal pain, appendicitis suspected (Age => 12y);    TECHNIQUE:  Low dose axial images, sagittal and coronal reformations were obtained from the lung bases to the pubic symphysis following the IV administration of 100 mL of Omnipaque 350    COMPARISON:  None.    FINDINGS:  Abdomen:    - Lung bases: Clear.    - Liver: Normal.    - Gallbladder and bile ducts: Unremarkable.    - Spleen: Unremarkable.    - Pancreas: Normal.    - Kidneys: Punctate non-obstructing 2 mm stone upper pole of the left kidney.  Left kidney and collecting system otherwise unremarkable.  There is a 5 x 4 mm stone at the right UVJ as well as an additional 3 mm stone in within the distal right ureter immediately proximal to it with some surrounding  periureteral inflammatory change.  The right ureter and intrarenal collecting system are mildly dilated and there is some asymmetric right-sided perinephric inflammatory stranding.  There is a slightly asymmetric and delayed nephrogram on the right.  Constellation of findings suggests obstructive uropathy with superimposed infection/pyelonephritis.    - Adrenals: Unremarkable.    - Retroperitoneum:  No significant adenopathy.    - Vascular: Unremarkable.    - Bowel/mesentery: Unremarkable.    Pelvis:    No pelvic mass, adenopathy, or free fluid.    Bones:  Unremarkable.                               US Abdomen Limited (Final result)  Result time 11/22/20 15:22:13    Final result by Gerardo Magana MD (11/22/20 15:22:13)                 Impression:      No cholelithiasis or sonographic evidence of acute cholecystitis.    Mild splenomegaly and borderline hepatomegaly.      Electronically signed by: Gerardo Magana MD  Date:    11/22/2020  Time:    15:22             Narrative:    EXAMINATION:  US ABDOMEN LIMITED    CLINICAL HISTORY:  query emory;    TECHNIQUE:  Limited ultrasound of the right upper quadrant of the abdomen (including pancreas, liver, gallbladder, common bile duct, and spleen) was performed.    COMPARISON:  Gallbladder ultrasound 12/15/2005    FINDINGS:  Liver: Upper limits of normal in size, measuring 17.6 cm. Homogeneous echotexture. No focal hepatic lesions.    Gallbladder: Gallbladder is normally distended with suspected fold with Phrygian cap configuration at the fundic region.  No calculi, wall thickening, or pericholecystic fluid.  No sonographic Bowen's sign.    Biliary system: The common duct is not dilated, measuring 2 mm.  No intrahepatic ductal dilatation.    Spleen: Mildly enlarged measuring 13.2 cm, with otherwise normal homogeneous echotexture.    Miscellaneous: No upper abdominal ascites.  Imaged portions of the pancreas and IVC are within normal limits.                                  Medical Decision Making:   Differential Diagnosis:   Abdominal pain represents a profoundly broad differential, this patient's symptoms are nondescript in nature and while unlikely could represent-  Pancreatitis, cholecystitis, appendicitis, gastritis, cystitis, pyleonephritis, PUD, obstructions constipation neoplasm among a myriad of other diagnoses.     A thorough examination and history was undertaken and appropriate diagnostics ordered with a diagnosis identified as below based on summative findings.   Because the broad differential in potential for changes in symptoms and discussion was also undertaken related to the importance of follow-up return or acknowledgement of new or changes in symptoms.  Independently Interpreted Test(s):   I have ordered and independently interpreted X-rays - see prior notes.  Clinical Tests:   Lab Tests: Ordered and Reviewed  Radiological Study: Ordered and Reviewed                             Clinical Impression:     ICD-10-CM ICD-9-CM   1. Calculus of ureterovesical junction (UVJ)  N20.1 592.1   2. Right ureteral stone  N20.1 592.1                          ED Disposition Condition    Discharge Stable        ED Prescriptions     Medication Sig Dispense Start Date End Date Auth. Provider    oxyCODONE-acetaminophen (PERCOCET)  mg per tablet Take 1 tablet by mouth every 4 (four) hours as needed for Pain. 18 tablet 11/22/2020 11/25/2020 Rolan Del Cid MD    tamsulosin (FLOMAX) 0.4 mg Cap Take 1 capsule (0.4 mg total) by mouth once daily. 20 capsule 11/22/2020 11/22/2021 Rolan Del Cid MD    ondansetron (ZOFRAN-ODT) 4 MG TbDL Take 1 tablet (4 mg total) by mouth every 8 (eight) hours as needed (nausea). 12 tablet 11/22/2020 11/25/2020 Rolan Del Cid MD    naproxen (NAPROSYN) 500 MG tablet Take 1 tablet (500 mg total) by mouth 2 (two) times daily with meals. 60 tablet 11/22/2020  Rolan Del Cid MD        Follow-up Information     Follow up With Specialties  Details Why Contact Info    Brionna Art MD Urology Schedule an appointment as soon as possible for a visit   [address] [facility]  [address]  [facility] [address]  [phone]      [facility]  If symptoms worsen [address] [address] [phone]                        Scribe Attestation A scribe was used in creating this document and while some errors may exist it is the responsibility of Dr. Monty Tavares. To the best of reasonable ability this represents his impressions               Monty Tavares MD  11/22/20 204

## 2020-11-23 NOTE — ED NOTES
APPEARANCE: Alert, oriented and in no acute distress. RUQ abdominal pain  CARDIAC: HR tachycardia and rhythm, no murmur heard.   GASTRO: soft, bowel sounds normal, RUQ abdominal pain and tenderness, no abdominal distention.   abnormalities.

## 2020-11-25 ENCOUNTER — TELEPHONE (OUTPATIENT)
Dept: ADMINISTRATIVE | Facility: OTHER | Age: 45
End: 2020-11-25

## 2020-11-27 ENCOUNTER — TELEPHONE (OUTPATIENT)
Dept: ADMINISTRATIVE | Facility: OTHER | Age: 45
End: 2020-11-27